# Patient Record
Sex: FEMALE | Race: ASIAN | Employment: OTHER | URBAN - METROPOLITAN AREA
[De-identification: names, ages, dates, MRNs, and addresses within clinical notes are randomized per-mention and may not be internally consistent; named-entity substitution may affect disease eponyms.]

---

## 2019-02-18 ENCOUNTER — HOSPITAL ENCOUNTER (OUTPATIENT)
Dept: CT IMAGING | Age: 69
Discharge: HOME | End: 2019-02-18
Payer: SELF-PAY

## 2019-02-18 DIAGNOSIS — E78.00 HIGH CHOLESTEROL: ICD-10-CM

## 2019-02-18 PROCEDURE — 75571 CT HRT W/O DYE W/CA TEST: CPT

## 2019-02-20 NOTE — CARDIO/PULMONARY
Cardiopulmonary Rehab: I reached this patient by phone and shared her coronary calcium CT score of \"42 \" with her. Education given regarding coronary artery disease and its effects on the cardiovascular system were reviewed. Patient states that she does not have a family history of coronary artery disease, that she does have diabetes, and  states she is not a nicotine user. Patient reports she is currently requiring cholesterol medication but not blood pressure medication. Patient reports being of normal weight, reports making heart healthy diet choices on most days and is regularly physically active. Patient does not feel that stress is a risk factor for her. Patient to follow up with primary care physician, Dr. De Cox, and asks that we fax a copy of this report to him - which we will do. Understanding verbalized and no further questions at this time.

## 2019-02-21 ENCOUNTER — TELEPHONE (OUTPATIENT)
Dept: CARDIAC REHAB | Age: 69
End: 2019-02-21

## 2019-02-21 NOTE — TELEPHONE ENCOUNTER
2/21/2019 [facility]ness: Faxed Ms. Montanez Done heart ct scan results to physician, Dr. Robbie Edwards, per Sujatha Rodriguez request. Kali Julien

## 2021-02-26 ENCOUNTER — TRANSCRIBE ORDER (OUTPATIENT)
Dept: GENERAL RADIOLOGY | Age: 71
End: 2021-02-26

## 2021-02-26 ENCOUNTER — HOSPITAL ENCOUNTER (OUTPATIENT)
Dept: GENERAL RADIOLOGY | Age: 71
Discharge: HOME | End: 2021-02-26
Payer: MEDICARE

## 2021-02-26 DIAGNOSIS — M25.511 PAIN IN RIGHT SHOULDER: ICD-10-CM

## 2021-02-26 DIAGNOSIS — M25.512 PAIN IN LEFT SHOULDER: ICD-10-CM

## 2021-02-26 DIAGNOSIS — M25.511 PAIN IN RIGHT SHOULDER: Primary | ICD-10-CM

## 2021-02-26 PROCEDURE — 71046 X-RAY EXAM CHEST 2 VIEWS: CPT | Performed by: INTERNAL MEDICINE

## 2021-07-15 ENCOUNTER — OFFICE VISIT (OUTPATIENT)
Dept: ENT CLINIC | Age: 71
End: 2021-07-15
Payer: MEDICARE

## 2021-07-15 VITALS
HEIGHT: 63 IN | DIASTOLIC BLOOD PRESSURE: 68 MMHG | RESPIRATION RATE: 18 BRPM | WEIGHT: 134 LBS | BODY MASS INDEX: 23.74 KG/M2 | SYSTOLIC BLOOD PRESSURE: 108 MMHG | HEART RATE: 75 BPM | OXYGEN SATURATION: 97 % | TEMPERATURE: 97.1 F

## 2021-07-15 DIAGNOSIS — K13.21 ORAL LEUKOPLAKIA: ICD-10-CM

## 2021-07-15 DIAGNOSIS — R42 DIZZINESS: Primary | ICD-10-CM

## 2021-07-15 PROCEDURE — G8420 CALC BMI NORM PARAMETERS: HCPCS | Performed by: OTOLARYNGOLOGY

## 2021-07-15 PROCEDURE — 1090F PRES/ABSN URINE INCON ASSESS: CPT | Performed by: OTOLARYNGOLOGY

## 2021-07-15 PROCEDURE — G8510 SCR DEP NEG, NO PLAN REQD: HCPCS | Performed by: OTOLARYNGOLOGY

## 2021-07-15 PROCEDURE — 3017F COLORECTAL CA SCREEN DOC REV: CPT | Performed by: OTOLARYNGOLOGY

## 2021-07-15 PROCEDURE — G8400 PT W/DXA NO RESULTS DOC: HCPCS | Performed by: OTOLARYNGOLOGY

## 2021-07-15 PROCEDURE — G8427 DOCREV CUR MEDS BY ELIG CLIN: HCPCS | Performed by: OTOLARYNGOLOGY

## 2021-07-15 PROCEDURE — 99204 OFFICE O/P NEW MOD 45 MIN: CPT | Performed by: OTOLARYNGOLOGY

## 2021-07-15 PROCEDURE — G8536 NO DOC ELDER MAL SCRN: HCPCS | Performed by: OTOLARYNGOLOGY

## 2021-07-15 PROCEDURE — 1101F PT FALLS ASSESS-DOCD LE1/YR: CPT | Performed by: OTOLARYNGOLOGY

## 2021-07-15 RX ORDER — CHLORHEXIDINE GLUCONATE 1.2 MG/ML
15 RINSE ORAL EVERY 12 HOURS
Qty: 420 ML | Refills: 0 | Status: SHIPPED | OUTPATIENT
Start: 2021-07-15 | End: 2021-07-29

## 2021-07-15 NOTE — PROGRESS NOTES
1. Have you been to the ER, urgent care clinic since your last visit? Hospitalized since your last visit? No    2. Have you seen or consulted any other health care providers outside of the [address] [place] [address] since your last visit? Include any pap smears or colon screening.  No   Chief Complaint   Patient presents with    Dizziness     Patient c/o dizziness

## 2021-07-19 NOTE — PROGRESS NOTES
Otolaryngology-Head and Neck Surgery  New Patient Visit     Patient: Dionne Eduardo  YOB: 1950  MRN: [id number]  Date of Service: 7/15/2021    Chief Complaint:   Chief Complaint   Patient presents with    Dizziness     Patient c/o dizziness         History of Present Illness: Dionne Eduardo is a 69y.o. year old female who presents today for discussion of dizziness. Notes sudden onset of dizziness last week. Describes room spinning dizziness, intermittent episodes, with sensation of fogginess. Duration of episodes slightly unclear, perhaps minutes    Over the last few days has felt better    Often triggered by lying down, though unsure of laterality    No hearing concerns or changes    No medication changes    Now feels back to normal    Past Medical History:  Past Medical History:   Diagnosis Date    [facility]     [facility] ([facility]     Dizziness     Hypercholesteremia     Hyperlipidemia        Past Surgical History:   No past surgical history on file. Medications:   Current Outpatient Medications   Medication Instructions    chlorhexidine (Peridex) 0.12 % solution 15 mL, Swish and Spit, EVERY 12 HOURS    lovastatin (MEVACOR) 40 mg, EVERY BEDTIME    metformin (GLUCOPHAGE) 500 mg tablet 2 TIMES DAILY WITH MEALS       Allergies: Allergies   Allergen Reactions    Aspirin Nausea Only     Gi upset       Social History:   Social History     Tobacco Use    Smoking status: Current Every Day Smoker     Types: Cigarettes    Smokeless tobacco: Never Used   Vaping Use    Vaping Use: Never used   Substance Use Topics    Alcohol use: No    Drug use: No        Family History:  Family History   Family history unknown: Yes       Review of Systems:    Consitutional: denies fever, excessive weight gain or loss. Eyes: denies diplopia, eye pain. Integumentary: denies new concerning skin lesions. Ears, Nose, Mouth, Throat: denies except as per HPI.   Endocrine: denies hot or cold intolerance, increased thirst.  Respiratory: denies cough, hemoptysis, wheezing  Gastrointestinal: denies trouble swallowing, nausea, emesis, regurgitation  Musculoskeletal: denies muscle weakness or wasting  Cardiovascular: denies chest pain, shortness of breath  Neurologic: denies seizures, numbness or tingling, syncope  Hematologic: denies easy bleeding or bruising    Physical Examination:   Vitals:    07/15/21 1105   BP: 108/68   BP 1 Location: Left upper arm   BP Patient Position: Sitting   BP Cuff Size: Adult   Pulse: 75   Temp: 97.1 °F (36.2 °C)   TempSrc: Temporal   Resp: 18   Height: 5' 3\" (1.6 m)   Weight: 134 lb (60.8 kg)   SpO2: 97%        General: Comfortable, pleasant, appears stated age  Voice: Strong, speaking in full sentences, no stridor    Face: No masses or lesions, facial strength symmetric   Ears: External ears unremarkable. Bilateral ear canal clear. Tympanic membrane clear and intact, with visible landmarks. Clear middle ear space  Nose: External nose unremarkable. Dorsum midline. Anterior rhinoscopy demonstrates no lesions. Septum midline. Turbinates without hypertrophy. Oral Cavity / Oropharynx: No trismus. Patchy leukoplakia involving right buccal mucosa, about 1.5 cm. Does not bleed with manipulation, non tender. No lesions. Tongue is midline and mobile. Palate elevates symmetrically. Uvula midline. Tonsils unremarkable. Base of tongue soft. Floor of mouth soft. Neck: Supple. No adenopathy. Thyroid unremarkable. Palpable laryngeal landmarks. Full neck range of motion   Neurologic: CN II - XI intact. Normal gait. No spontaneous, gaze induced, head shaky nystagmus. Neg No Halpike in either direction      Assessment and Plan:   1. Dizziness  a. Has improved  b. Suspect may be BPPV, though history slightly difficult to elucidate  c. Sleep slightly upright  d. Avoid sudden headmovements  e. Home Ryan Yeh Daroff exercises provided to patient   2. Oral leukoplakia  3.  Hx of tobacco, chewing tobacco (betel nut a few years ago)  a. Pt asymptomatic  b. Incidentally noted on exam today  c. Given tobacco hx would recheck to ensure stability  d. Consider bioppsy if any changes or symptoms  e. Follow up at our [facility] office   f. Signs to watch out for reviewed        The patient was instructed to return to clinic if no improvement or progression of symptoms. Signs to watch out for reviewed.       MD Mark SykesSl[facility]  [address] [place] [address]  Office Phone: [phone]

## 2021-08-03 ENCOUNTER — TELEPHONE (OUTPATIENT)
Dept: ENT CLINIC | Age: 71
End: 2021-08-03

## 2021-08-03 NOTE — TELEPHONE ENCOUNTER
Patient's  called to schedule an appointment because the patient started a vertigo spell. Patient's  wanted to come in on Thursday if possible.  Best call back number: [phone]

## 2021-08-03 NOTE — TELEPHONE ENCOUNTER
Called spoke wit pt , who states felling drowsy having an episode of vertigo per Dr Moises Esteban informed pt to do the exercises provided given last appt, pt accepted appt for this Thursday 8/5/21 at 11 am.   Pt verbalized understanding of information discussed w/ no further questions at this time.

## 2021-08-05 ENCOUNTER — OFFICE VISIT (OUTPATIENT)
Dept: ENT CLINIC | Age: 71
End: 2021-08-05

## 2021-08-05 ENCOUNTER — OFFICE VISIT (OUTPATIENT)
Dept: ENT CLINIC | Age: 71
End: 2021-08-05
Payer: MEDICARE

## 2021-08-05 VITALS
TEMPERATURE: 97.5 F | WEIGHT: 133 LBS | BODY MASS INDEX: 23.57 KG/M2 | HEIGHT: 63 IN | OXYGEN SATURATION: 98 % | SYSTOLIC BLOOD PRESSURE: 120 MMHG | DIASTOLIC BLOOD PRESSURE: 72 MMHG | RESPIRATION RATE: 16 BRPM | HEART RATE: 78 BPM

## 2021-08-05 DIAGNOSIS — K13.21 ORAL LEUKOPLAKIA: ICD-10-CM

## 2021-08-05 DIAGNOSIS — H90.3 SENSORINEURAL HEARING LOSS (SNHL) OF BOTH EARS: ICD-10-CM

## 2021-08-05 DIAGNOSIS — R42 DIZZINESS: Primary | ICD-10-CM

## 2021-08-05 PROCEDURE — G8427 DOCREV CUR MEDS BY ELIG CLIN: HCPCS | Performed by: OTOLARYNGOLOGY

## 2021-08-05 PROCEDURE — G8536 NO DOC ELDER MAL SCRN: HCPCS | Performed by: OTOLARYNGOLOGY

## 2021-08-05 PROCEDURE — 1090F PRES/ABSN URINE INCON ASSESS: CPT | Performed by: OTOLARYNGOLOGY

## 2021-08-05 PROCEDURE — 99214 OFFICE O/P EST MOD 30 MIN: CPT | Performed by: OTOLARYNGOLOGY

## 2021-08-05 PROCEDURE — 1101F PT FALLS ASSESS-DOCD LE1/YR: CPT | Performed by: OTOLARYNGOLOGY

## 2021-08-05 PROCEDURE — G8420 CALC BMI NORM PARAMETERS: HCPCS | Performed by: OTOLARYNGOLOGY

## 2021-08-05 PROCEDURE — 92567 TYMPANOMETRY: CPT | Performed by: AUDIOLOGIST

## 2021-08-05 PROCEDURE — G8432 DEP SCR NOT DOC, RNG: HCPCS | Performed by: OTOLARYNGOLOGY

## 2021-08-05 PROCEDURE — 3017F COLORECTAL CA SCREEN DOC REV: CPT | Performed by: OTOLARYNGOLOGY

## 2021-08-05 PROCEDURE — G8400 PT W/DXA NO RESULTS DOC: HCPCS | Performed by: OTOLARYNGOLOGY

## 2021-08-05 PROCEDURE — 92557 COMPREHENSIVE HEARING TEST: CPT | Performed by: AUDIOLOGIST

## 2021-08-05 NOTE — LETTER
8/5/2021    Patient: Jonn Linder   YOB: 1950   Date of Visit: 8/5/2021     Luis Burk MD  [place] [address],[address] [place] [address]  Via Fax: [phone]    Dear Luis Burk MD,      Thank you for referring Ms. Jonn Linder to [facility] EAR NOSE AND THROAT [address] for evaluation. My notes for this consultation are attached. If you have questions, please do not hesitate to call me. I look forward to following your patient along with you.       Sincerely,    Dania Antunez MD

## 2021-08-05 NOTE — PROGRESS NOTES
1. Have you been to the ER, urgent care clinic since your last visit? Hospitalized since your last visit? No    2. Have you seen or consulted any other health care providers outside of the [address] [place] [address] since your last visit? Include any pap smears or colon screening.  No   Chief Complaint   Patient presents with    Follow-up     Patient is here to f/u on Dizziness and Leukoplakia

## 2021-08-05 NOTE — PROGRESS NOTES
Lorenza Grossman, a 66y.o. year old female, was seen in ENT clinic today for a hearing evaluation on referral from Dr. Arline Presley. Patient complains of vertigo and occasional tinnitus. Patient reports that she has no concerns regarding her hearing. Per patient report she had a hearing test 2 years prior and results were \"normal\" (records unavailable today). Patient's most recent vertigo episode was 8-2-2021. Otoscopy: normal external ear canals, bilaterally. Cerumen present in the right ear canal; tympanic membrane not visible. Tympanometry: RE Type Ad, hypermobile  LE Type A, normal    SRT: RE Speech Reception Threshold (SRT) was obtained at 35 dBHL LE Speech Reception Threshold (SRT) was obtained at 30 dBHL    WRS: RE Good in quiet when words were presented at 75 dBHL. WRS: LE Good in quiet when words were presented at 70 dBHL. Pure tone audiometry:*  RE: Borderline normal sloping to severe sensorineural hearing loss  LE: WNL sloping to moderate-severe sensorineural hearing loss    *Fair reliability. Patient required multiple attempts at reinstruction, particularly with masked bone thresholds. Patient's  remained in the araiza during testing to assist and explain testing instructions. Sensorineural hearing loss, bilaterally. No gross asymmetry noted    Impressions:  hearing loss requiring medical/otologic and audiologic follow-up  Discussed results of today's testing with patient. Patient is a good candidate for amplification. Discussed benefits of hearing aids and recommended hearing aid evaluation to discuss options if patient feels she is having difficulty hearing. Patient stated that she felt she was hearing well at this time \"as long as people speak slowly. \" Provided familiar sounds audiogram with average conversational volume noted and discussed patient's difficulty with hearing certain sounds of speech at normal conversational volume. Reiterated that hearing aids would help.  Patient would like to wait on pursuing amplification at this time. Recommended repeat hearing test in 1 year or sooner if change is noted. Recommended follow-up with ENT for vertigo. Provided hearing aid evaluation handout today. Plan:  Follow-up with ENT. Repeat audiogram 1 year or sooner if change is noted.   Hearing aid evaluation upon request.    Carole Flores   Doctor of Audiology

## 2021-08-05 NOTE — PROGRESS NOTES
Otolaryngology-Head and Neck Surgery  Follow Up Patient Visit     Patient: Howie Donohue  YOB: 1950  MRN: [id number]  Date of Service: 8/5/2021      Chief Complaint:   Chief Complaint   Patient presents with    Follow-up     Patient is here to f/u on Dizziness and Leukoplakia     Interval hx  Notes trial of peridex seemed to burn, sting her mouth  Denies pain but does have sensitivity to spicy foods   No bleeding    Did well with dizziness until Monday when again sudden onset of vertigo, lasting seconds, when lying back to the left  Feels tinnitus but denies hearing concerns or changes    History of Present Illness: Howie Donohue is a 66y.o. year old female who presents today for discussion of dizziness. Notes sudden onset of dizziness last week. Describes room spinning dizziness, intermittent episodes, with sensation of fogginess. Duration of episodes slightly unclear, perhaps minutes    Over the last few days has felt better    Often triggered by lying down, though unsure of laterality    No hearing concerns or changes    No medication changes    Now feels back to normal    Past Medical History:  Past Medical History:   Diagnosis Date    [facility]     [facility] ([facility]     Dizziness     Hypercholesteremia     Hyperlipidemia        Past Surgical History:   No past surgical history on file. Medications:   Current Outpatient Medications   Medication Instructions    lovastatin (MEVACOR) 40 mg, EVERY BEDTIME    metformin (GLUCOPHAGE) 500 mg tablet 2 TIMES DAILY WITH MEALS       Allergies:    Allergies   Allergen Reactions    Aspirin Nausea Only     Gi upset       Social History:   Social History     Tobacco Use    Smoking status: Current Every Day Smoker     Types: Cigarettes    Smokeless tobacco: Never Used   Vaping Use    Vaping Use: Never used   Substance Use Topics    Alcohol use: No    Drug use: No        Family History:  Family History   Family history unknown: Yes       Review of Systems:    Consitutional: denies fever, excessive weight gain or loss. Eyes: denies diplopia, eye pain. Integumentary: denies new concerning skin lesions. Ears, Nose, Mouth, Throat: denies except as per HPI. Endocrine: denies hot or cold intolerance, increased thirst.  Respiratory: denies cough, hemoptysis, wheezing  Gastrointestinal: denies trouble swallowing, nausea, emesis, regurgitation  Musculoskeletal: denies muscle weakness or wasting  Cardiovascular: denies chest pain, shortness of breath  Neurologic: denies seizures, numbness or tingling, syncope  Hematologic: denies easy bleeding or bruising    Physical Examination:   Vitals:    08/05/21 1041   BP: 120/72   BP 1 Location: Left upper arm   BP Patient Position: Sitting   BP Cuff Size: Adult   Pulse: 78   Temp: 97.5 °F (36.4 °C)   TempSrc: Temporal   Resp: 16   Height: 5' 3\" (1.6 m)   Weight: 133 lb (60.3 kg)   SpO2: 98%        General: Comfortable, pleasant, appears stated age  Voice: Strong, speaking in full sentences, no stridor    Face: No masses or lesions, facial strength symmetric   Ears: External ears unremarkable. Bilateral ear canal clear. Tympanic membrane clear and intact, with visible landmarks. Clear middle ear space  Nose: External nose unremarkable. Dorsum midline. Anterior rhinoscopy demonstrates no lesions. Septum midline. Turbinates without hypertrophy. Oral Cavity / Oropharynx: No trismus. Patchy leukoplakia involving right buccal mucosa, about 1.5 cm. Does not bleed with manipulation, tender to manipulation. Tongue is midline and mobile. Palate elevates symmetrically. Uvula midline. Tonsils unremarkable. Base of tongue soft. Floor of mouth soft. Neck: Supple. No adenopathy. Thyroid unremarkable. Palpable laryngeal landmarks. Full neck range of motion   Neurologic: CN II - XI intact. Normal gait. No spontaneous, gaze induced, head shake nystagmus. Neg No Halpike in either direction.  Epley maneuver to the left performed    Audiogram shows bilateral SNHL, WRS 80%    Assessment and Plan:   1. Dizziness  a. None today  b. Suspect may be BPPV, though Olga [place] negative today  c. S/p Epley maneuver today  d. Sleep slightly upright  e. Avoid sudden headmovements  f. Home Rosa Antony Daroff exercises provided to patient   2. Oral leukoplakia  3. Hx of tobacco, chewing tobacco (betel nut a few years ago)  a. Incidentally noted on exam LOV   b. Its certainly not better and she does admit to some sensitivity with certain foods   c. Will plan for biopsy, she has an upcoming wedding next week, so will plan to do after that       The patient was instructed to return to clinic if no improvement or progression of symptoms. Signs to watch out for reviewed.       MD Akua [facility]ileAt[facility]  [address] [place] [address]  Office Phone: [phone]

## 2021-08-20 ENCOUNTER — OFFICE VISIT (OUTPATIENT)
Dept: ENT CLINIC | Age: 71
End: 2021-08-20
Payer: MEDICARE

## 2021-08-20 VITALS
DIASTOLIC BLOOD PRESSURE: 68 MMHG | WEIGHT: 133 LBS | OXYGEN SATURATION: 98 % | BODY MASS INDEX: 23.57 KG/M2 | TEMPERATURE: 98.2 F | HEIGHT: 63 IN | HEART RATE: 88 BPM | RESPIRATION RATE: 16 BRPM | SYSTOLIC BLOOD PRESSURE: 120 MMHG

## 2021-08-20 DIAGNOSIS — K13.21 ORAL LEUKOPLAKIA: Primary | ICD-10-CM

## 2021-08-20 PROCEDURE — 40808 BIOPSY OF MOUTH LESION: CPT | Performed by: OTOLARYNGOLOGY

## 2021-08-20 NOTE — PROGRESS NOTES
Otolaryngology-Head and Neck Surgery  Procedure     Patient: Howie Donohue  YOB: 1950  MRN: [id number]  Date of Service: 8/20/2021      Chief Complaint:   Chief Complaint   Patient presents with    Minor Surgery     Patient is here today to have oral biopsy performed. No further dizziness  No changes oral cavity    Past Medical History:  Past Medical History:   Diagnosis Date    [facility]     Diabetes ([facility]     Dizziness     Hypercholesteremia     Hyperlipidemia        Past Surgical History:   No past surgical history on file. Medications:   Current Outpatient Medications   Medication Instructions    lovastatin (MEVACOR) 40 mg, EVERY BEDTIME    metformin (GLUCOPHAGE) 500 mg tablet 2 TIMES DAILY WITH MEALS       Allergies: Allergies   Allergen Reactions    Aspirin Nausea Only     Gi upset       Social History:   Social History     Tobacco Use    Smoking status: Current Every Day Smoker     Types: Cigarettes    Smokeless tobacco: Never Used   Vaping Use    Vaping Use: Never used   Substance Use Topics    Alcohol use: No    Drug use: No        Family History:  Family History   Family history unknown: Yes       Review of Systems:    Consitutional: denies fever, excessive weight gain or loss. Eyes: denies diplopia, eye pain. Integumentary: denies new concerning skin lesions. Ears, Nose, Mouth, Throat: denies except as per HPI.   Endocrine: denies hot or cold intolerance, increased thirst.  Respiratory: denies cough, hemoptysis, wheezing  Gastrointestinal: denies trouble swallowing, nausea, emesis, regurgitation  Musculoskeletal: denies muscle weakness or wasting  Cardiovascular: denies chest pain, shortness of breath  Neurologic: denies seizures, numbness or tingling, syncope  Hematologic: denies easy bleeding or bruising    Physical Examination:   Vitals:    08/20/21 1306   BP: 120/68   BP 1 Location: Left upper arm   BP Patient Position: Sitting   BP Cuff Size: Adult Pulse: 88   Temp: 98.2 °F (36.8 °C)   TempSrc: Temporal   Resp: 16   Height: 5' 3\" (1.6 m)   Weight: 133 lb (60.3 kg)   SpO2: 98%        General: Comfortable, pleasant, appears stated age  Voice: Strong, speaking in full sentences, no stridor    Oral Cavity / Oropharynx: No trismus. Patchy leukoplakia involving right buccal mucosa, about 1.5 cm. Does not bleed with manipulation, tender to manipulation. Tongue is midline and mobile. Palate elevates symmetrically. Uvula midline. Tonsils unremarkable. Base of tongue soft. Floor of mouth soft. PROCEDURE: ORAL CAVITY BIOPSY     Pre-Op Diagnosis: Oral leukoplakia   Post-Op Diagnosis: Same as preoperative diagnosis. Procedure(s): Biopsy of oral cavity   Anesthesia: Local, 2% lidocaine, 1:100,000 epinephrine      Description of Procedure:   Informed consent was obtained. Risks, benefits and alternatives reviewed including risk of bleeding, infection, non diagnostic specimen, need for repeat procedure. Cetacaine was used to topically anesthetize the lesion. Local anesthetic with 1% lidocaine, 1:100,000 epinephrine was infiltrated along the lesion. A thru cutting instrument and scissor were used to obtain a biopsy. Hemostasis was achieved with silver nitrate cautery. She tolerated the procedure well. Assessment and Plan:   a. Oral leukoplakia  i. S/p Biopsy today  ii. Per patient preference, I will call with the results        The patient was instructed to return to clinic if no improvement or progression of symptoms. Signs to watch out for reviewed.       MD Mark Huggins[facility]  [address] [place] [address] 6  [facility]  Office Phone: [phone]

## 2021-08-20 NOTE — PROGRESS NOTES
Visit Vitals  /68 (BP 1 Location: Left upper arm, BP Patient Position: Sitting, BP Cuff Size: Adult)   Pulse 88   Temp 98.2 °F (36.8 °C) (Temporal)   Resp 16   Ht 5' 3\" (1.6 m)   Wt 133 lb (60.3 kg)   SpO2 98%   BMI 23.56 kg/m²     Chief Complaint   Patient presents with    Minor Surgery     Patient is here today to have oral biopsy performed.

## 2021-08-20 NOTE — LETTER
8/20/2021    Patient: Carly Blair   YOB: 1950   Date of Visit: 8/20/2021     Jose Ryan MD  [place] [address]  Via Fax: [phone]    Dear Jose Ryan MD,      Thank you for referring Ms. Carly Blair to [facility] EAR NOSE AND THROAT [address] for evaluation. My notes for this consultation are attached. If you have questions, please do not hesitate to call me. I look forward to following your patient along with you.       Sincerely,    Kris Tejada MD

## 2021-08-23 ENCOUNTER — TRANSCRIBE ORDER (OUTPATIENT)
Dept: SCHEDULING | Age: 71
End: 2021-08-23

## 2021-08-23 DIAGNOSIS — Z13.820 SCREENING FOR OSTEOPOROSIS: Primary | ICD-10-CM

## 2021-08-25 ENCOUNTER — TRANSCRIBE ORDER (OUTPATIENT)
Dept: SCHEDULING | Age: 71
End: 2021-08-25

## 2021-08-25 DIAGNOSIS — M81.0 AGE-RELATED OSTEOPOROSIS WITHOUT CURRENT PATHOLOGICAL FRACTURE: ICD-10-CM

## 2021-08-25 DIAGNOSIS — M85.80 OSTEOPENIA OF THE ELDERLY: Primary | ICD-10-CM

## 2021-08-25 LAB
CPT CODES, 490044: NORMAL
CPT DISCLAIMER: NORMAL
CYTOLOGY SPEC DOC CYTO: NORMAL
DIAGNOSIS SYNOPSIS:: NORMAL
DX ICD CODE: NORMAL
PATH REPORT.GROSS SPEC: NORMAL
PATHOLOGIST NAME: NORMAL
SPECIMEN SOURCE: NORMAL

## 2021-08-26 ENCOUNTER — TELEPHONE (OUTPATIENT)
Dept: ENT CLINIC | Age: 71
End: 2021-08-26

## 2021-08-27 ENCOUNTER — TELEPHONE (OUTPATIENT)
Dept: ENT CLINIC | Age: 71
End: 2021-08-27

## 2021-09-03 ENCOUNTER — HOSPITAL ENCOUNTER (OUTPATIENT)
Dept: MAMMOGRAPHY | Age: 71
Discharge: HOME | End: 2021-09-03
Attending: INTERNAL MEDICINE
Payer: MEDICARE

## 2021-09-03 DIAGNOSIS — M85.80 OSTEOPENIA OF THE ELDERLY: ICD-10-CM

## 2021-09-03 DIAGNOSIS — M81.0 AGE-RELATED OSTEOPOROSIS WITHOUT CURRENT PATHOLOGICAL FRACTURE: ICD-10-CM

## 2021-09-03 PROCEDURE — 77080 DXA BONE DENSITY AXIAL: CPT

## 2021-09-29 ENCOUNTER — TELEPHONE (OUTPATIENT)
Dept: ENT CLINIC | Age: 71
End: 2021-09-29

## 2021-10-22 ENCOUNTER — OFFICE VISIT (OUTPATIENT)
Dept: ENT CLINIC | Age: 71
End: 2021-10-22
Payer: MEDICARE

## 2021-10-22 VITALS
WEIGHT: 134 LBS | TEMPERATURE: 97.2 F | RESPIRATION RATE: 16 BRPM | SYSTOLIC BLOOD PRESSURE: 124 MMHG | OXYGEN SATURATION: 98 % | DIASTOLIC BLOOD PRESSURE: 72 MMHG | HEIGHT: 63 IN | HEART RATE: 91 BPM | BODY MASS INDEX: 23.74 KG/M2

## 2021-10-22 DIAGNOSIS — K13.21 ORAL LEUKOPLAKIA: Primary | ICD-10-CM

## 2021-10-22 DIAGNOSIS — R42 DIZZINESS: ICD-10-CM

## 2021-10-22 PROCEDURE — 99213 OFFICE O/P EST LOW 20 MIN: CPT | Performed by: OTOLARYNGOLOGY

## 2021-10-22 PROCEDURE — G8420 CALC BMI NORM PARAMETERS: HCPCS | Performed by: OTOLARYNGOLOGY

## 2021-10-22 PROCEDURE — G8399 PT W/DXA RESULTS DOCUMENT: HCPCS | Performed by: OTOLARYNGOLOGY

## 2021-10-22 PROCEDURE — G8432 DEP SCR NOT DOC, RNG: HCPCS | Performed by: OTOLARYNGOLOGY

## 2021-10-22 PROCEDURE — 1101F PT FALLS ASSESS-DOCD LE1/YR: CPT | Performed by: OTOLARYNGOLOGY

## 2021-10-22 PROCEDURE — 3017F COLORECTAL CA SCREEN DOC REV: CPT | Performed by: OTOLARYNGOLOGY

## 2021-10-22 PROCEDURE — G8536 NO DOC ELDER MAL SCRN: HCPCS | Performed by: OTOLARYNGOLOGY

## 2021-10-22 PROCEDURE — 1090F PRES/ABSN URINE INCON ASSESS: CPT | Performed by: OTOLARYNGOLOGY

## 2021-10-22 PROCEDURE — G8427 DOCREV CUR MEDS BY ELIG CLIN: HCPCS | Performed by: OTOLARYNGOLOGY

## 2021-10-22 RX ORDER — TRIAMCINOLONE ACETONIDE 1 MG/G
PASTE DENTAL
Qty: 5 G | Refills: 0 | Status: SHIPPED | OUTPATIENT
Start: 2021-10-22

## 2021-10-22 NOTE — PATIENT INSTRUCTIONS
Visit Vitals  /72 (BP 1 Location: Left upper arm, BP Patient Position: Sitting, BP Cuff Size: Adult)   Pulse 91   Temp 97.2 °F (36.2 °C) (Temporal)   Resp 16   Ht 5' 3\" (1.6 m)   Wt 134 lb (60.8 kg)   SpO2 98%   BMI 23.74 kg/m²     Chief Complaint   Patient presents with    Follow-up     Here ti recheck oral Leukoplakia / Dizziness / SNHL

## 2021-10-22 NOTE — PROGRESS NOTES
Otolaryngology-Head and Neck Surgery  Follow Up Patient Visit     Patient: Yaw Perez  YOB: 1950  MRN: [id number]  Date of Service: 10/22/2021      Chief Complaint:   Follow up mouth     Interval hx  Initally seen for dizziness which has improved, however noted to have oral leukoplakia  Underwent biopsy 2 months ago which was benign    Presents in follow up today    No pain or bleeding  Occasionally salty foods may sting    History of Present Illness: Yaw Perez is a 66y.o. year old female who presents today for discussion of dizziness. Notes sudden onset of dizziness last week. Describes room spinning dizziness, intermittent episodes, with sensation of fogginess. Duration of episodes slightly unclear, perhaps minutes    Over the last few days has felt better    Often triggered by lying down, though unsure of laterality    No hearing concerns or changes    No medication changes    Now feels back to normal    Past Medical History:  Past Medical History:   Diagnosis Date    Diabetes ([address]     Diabetes ([address]     Dizziness     Hypercholesteremia     Hyperlipidemia        Past Surgical History:   No past surgical history on file. Medications:   Current Outpatient Medications   Medication Instructions    lovastatin (MEVACOR) 40 mg, EVERY BEDTIME    metformin (GLUCOPHAGE) 500 mg tablet 2 TIMES DAILY WITH MEALS       Allergies: Allergies   Allergen Reactions    Aspirin Nausea Only     Gi upset       Social History:   Social History     Tobacco Use    Smoking status: Current Every Day Smoker     Types: Cigarettes    Smokeless tobacco: Never Used   Vaping Use    Vaping Use: Never used   Substance Use Topics    Alcohol use: No    Drug use: No        Family History:  Family History   Family history unknown: Yes       Review of Systems:    Consitutional: denies fever, excessive weight gain or loss. Eyes: denies diplopia, eye pain.   Integumentary: denies new concerning skin lesions. Ears, Nose, Mouth, Throat: denies except as per HPI. Endocrine: denies hot or cold intolerance, increased thirst.  Respiratory: denies cough, hemoptysis, wheezing  Gastrointestinal: denies trouble swallowing, nausea, emesis, regurgitation  Musculoskeletal: denies muscle weakness or wasting  Cardiovascular: denies chest pain, shortness of breath  Neurologic: denies seizures, numbness or tingling, syncope  Hematologic: denies easy bleeding or bruising    Physical Examination:   There were no vitals filed for this visit. General: Comfortable, pleasant, appears stated age  Voice: Strong, speaking in full sentences, no stridor    Face: No masses or lesions, facial strength symmetric   Ears: External ears unremarkable. Bilateral ear canal clear. Tympanic membrane clear and intact, with visible landmarks. Clear middle ear space  Nose: External nose unremarkable. Dorsum midline. Anterior rhinoscopy demonstrates no lesions. Septum midline. Turbinates without hypertrophy. Oral Cavity / Oropharynx: No trismus. Patchy leukoplakia involving right buccal mucosa, about 1.5 cm. Does not bleed with manipulation, non tender to manipulation. Very stable from prior. Tongue is midline and mobile. Palate elevates symmetrically. Uvula midline. Tonsils unremarkable. Base of tongue soft. Floor of mouth soft. Neck: Supple. No adenopathy. Thyroid unremarkable. Palpable laryngeal landmarks. Full neck range of motion   Neurologic: CN II - XI intact. Normal gait. No spontaneous, gaze induced, head shake nystagmus. Audiogram shows bilateral SNHL, WRS 80%          Oral pathology 8/2021  Component 8/20/21 1400   Diagnosis synopsis: Comment    Comment: Specimen A-Mucosa Biopsy, Oral, right buccal mucosa: BENIGN   SQUAMOUS EPITHELIUM WITH NONSPECIFIC PARAKERATOSIS AND   HYPERKERATOSIS. NEGATIVE FOR DYSPLASIA AND MALIGNANCY. Assessment and Plan:   1. Dizziness  a. Has improved   b. Audiogram normal  2.  Oral leukoplakia  3. Hx of tobacco, chewing tobacco (betel nut a few years ago)  a. Incidentally noted on exam LOV   b. S/p biopsy - pathology printed result provided to patient today  c. Signs to watch out for discussed in detail  d. Can consider trial kenalog paste  e. Otherwise with minimal symptoms, benign biopsy, and stability in appearance, would favor cont observation  f. RTC in 3-4 months - sooner if issues      The patient was instructed to return to clinic if no improvement or progression of symptoms. Signs to watch out for reviewed.       MD [address] [facility]  [address] [place] [address] [address]  [facility]  Office Phone: [phone]

## 2021-12-06 ENCOUNTER — OFFICE VISIT (OUTPATIENT)
Dept: ENT CLINIC | Age: 71
End: 2021-12-06
Payer: MEDICARE

## 2021-12-06 VITALS
RESPIRATION RATE: 17 BRPM | TEMPERATURE: 97.4 F | DIASTOLIC BLOOD PRESSURE: 78 MMHG | WEIGHT: 134 LBS | HEIGHT: 63 IN | SYSTOLIC BLOOD PRESSURE: 118 MMHG | OXYGEN SATURATION: 98 % | BODY MASS INDEX: 23.74 KG/M2 | HEART RATE: 76 BPM

## 2021-12-06 DIAGNOSIS — K14.6 BURNING MOUTH SYNDROME: ICD-10-CM

## 2021-12-06 DIAGNOSIS — R68.2 DRY MOUTH: ICD-10-CM

## 2021-12-06 DIAGNOSIS — K13.21 LEUKOPLAKIA ORAL MUCOSA: Primary | ICD-10-CM

## 2021-12-06 PROCEDURE — 3017F COLORECTAL CA SCREEN DOC REV: CPT | Performed by: OTOLARYNGOLOGY

## 2021-12-06 PROCEDURE — 1090F PRES/ABSN URINE INCON ASSESS: CPT | Performed by: OTOLARYNGOLOGY

## 2021-12-06 PROCEDURE — G8420 CALC BMI NORM PARAMETERS: HCPCS | Performed by: OTOLARYNGOLOGY

## 2021-12-06 PROCEDURE — G8432 DEP SCR NOT DOC, RNG: HCPCS | Performed by: OTOLARYNGOLOGY

## 2021-12-06 PROCEDURE — G8536 NO DOC ELDER MAL SCRN: HCPCS | Performed by: OTOLARYNGOLOGY

## 2021-12-06 PROCEDURE — G8399 PT W/DXA RESULTS DOCUMENT: HCPCS | Performed by: OTOLARYNGOLOGY

## 2021-12-06 PROCEDURE — 99214 OFFICE O/P EST MOD 30 MIN: CPT | Performed by: OTOLARYNGOLOGY

## 2021-12-06 PROCEDURE — 1101F PT FALLS ASSESS-DOCD LE1/YR: CPT | Performed by: OTOLARYNGOLOGY

## 2021-12-06 PROCEDURE — G8427 DOCREV CUR MEDS BY ELIG CLIN: HCPCS | Performed by: OTOLARYNGOLOGY

## 2021-12-06 NOTE — PROGRESS NOTES
Visit Vitals  /78 (BP 1 Location: Left upper arm, BP Patient Position: Sitting, BP Cuff Size: Adult)   Pulse 76   Temp 97.4 °F (36.3 °C) (Temporal)   Resp 17   Ht 5' 3\" (1.6 m)   Wt 134 lb (60.8 kg)   SpO2 98%   BMI 23.74 kg/m²     Chief Complaint   Patient presents with    Follow-up     Oral leukoplakia

## 2021-12-06 NOTE — PROGRESS NOTES
Otolaryngology-Head and Neck Surgery  Follow Up Patient Visit     Patient: Marybeth Don  YOB: 1950  MRN: [id number]  Date of Service: 12/6/2021      Chief Complaint:   Follow up mouth     Interval hx  Initally seen for dizziness which has improved, however noted to have oral leukoplakia  Underwent biopsy 2 months ago which was benign    Presents in follow up today    No pain or bleeding  Occasionally salty foods may sting    History of Present Illness: Marybeth Don is a 66y.o. year old female who presents today for discussion of dizziness. Notes sudden onset of dizziness last week. Describes room spinning dizziness, intermittent episodes, with sensation of fogginess. Duration of episodes slightly unclear, perhaps minutes    Over the last few days has felt better    Often triggered by lying down, though unsure of laterality    No hearing concerns or changes    No medication changes    Now feels back to normal    12/6/21 -  6 week f/u pt states she used some of the kenalog paste, did not seem to help; she c/o burning both sides of mouth spencer with spicy or salty foods; also dry mouth at night; no further dizziness    Past Medical History:  Past Medical History:   Diagnosis Date    [facility]     Diabetes ([facility]     Dizziness     Hypercholesteremia     Hyperlipidemia        Past Surgical History:   History reviewed. No pertinent surgical history. Medications:   Current Outpatient Medications   Medication Instructions    lovastatin (MEVACOR) 40 mg, EVERY BEDTIME    metformin (GLUCOPHAGE) 500 mg tablet 2 TIMES DAILY WITH MEALS    triamcinolone acetonide (KENALOG) 0.1 % dental paste Apply to right cheek mucosa twice daily x 10 days       Allergies:    Allergies   Allergen Reactions    Aspirin Nausea Only     Gi upset       Social History:   Social History     Tobacco Use    Smoking status: Current Every Day Smoker     Types: Cigarettes    Smokeless tobacco: Never Used   Vaping Use    Vaping Use: Never used   Substance Use Topics    Alcohol use: No    Drug use: No        Family History:  Family History   Family history unknown: Yes       Review of Systems:    Consitutional: denies fever, excessive weight gain or loss. Eyes: denies diplopia, eye pain. Integumentary: denies new concerning skin lesions. Ears, Nose, Mouth, Throat: denies except as per HPI. Endocrine: denies hot or cold intolerance, increased thirst.  Respiratory: denies cough, hemoptysis, wheezing  Gastrointestinal: denies trouble swallowing, nausea, emesis, regurgitation  Musculoskeletal: denies muscle weakness or wasting  Cardiovascular: denies chest pain, shortness of breath  Neurologic: denies seizures, numbness or tingling, syncope  Hematologic: denies easy bleeding or bruising    Physical Examination:   Vitals:    12/06/21 1325   BP: 118/78   BP 1 Location: Left upper arm   BP Patient Position: Sitting   BP Cuff Size: Adult   Pulse: 76   Temp: 97.4 °F (36.3 °C)   TempSrc: Temporal   Resp: 17   Height: 5' 3\" (1.6 m)   Weight: 134 lb (60.8 kg)   SpO2: 98%        General: Comfortable, pleasant, appears stated age  Voice: Strong, speaking in full sentences, no stridor    Face: No masses or lesions, facial strength symmetric   Ears: External ears unremarkable. Bilateral ear canal clear. Tympanic membrane clear and intact, with visible landmarks. Clear middle ear space  Nose: External nose unremarkable. Dorsum midline. Anterior rhinoscopy demonstrates no lesions. Septum midline. Turbinates without hypertrophy. Oral Cavity / Oropharynx: No trismus. Leukoplakia involving right posterior buccal mucosa, about 1.5 cm. Does not bleed with manipulation, non tender to manipulation. Left side has slight pigmentation posteriorly no leukoplakia. Palate elevates symmetrically. Uvula midline. Tonsils unremarkable. Base of tongue soft. Floor of mouth soft. Neck: Supple. No adenopathy. Thyroid unremarkable. Palpable laryngeal landmarks. Full neck range of motion   Neurologic: CN II - XI intact. Normal gait. No spontaneous, gaze induced, head shake nystagmus. Audiogram shows bilateral SNHL, WRS 80%      Oral pathology 8/2021  Component 8/20/21 1400   Diagnosis synopsis: Comment    Comment: Specimen A-Mucosa Biopsy, Oral, right buccal mucosa: BENIGN   SQUAMOUS EPITHELIUM WITH NONSPECIFIC PARAKERATOSIS AND   HYPERKERATOSIS. NEGATIVE FOR DYSPLASIA AND MALIGNANCY. Assessment and Plan:   1. Dizziness  a. Has improved   b. Audiogram showing SNHL  2. Oral leukoplakia - biopsy benign. She also has burning mouth, dry mouth. I suggest she see her dentist also in case there is any trauma from her molars    Fu 3 mos    The patient was instructed to return to clinic if no improvement or progression of symptoms. Signs to watch out for reviewed.

## 2022-03-19 PROBLEM — K14.6 BURNING MOUTH SYNDROME: Status: ACTIVE | Noted: 2021-12-06

## 2022-03-19 PROBLEM — R68.2 DRY MOUTH: Status: ACTIVE | Noted: 2021-12-06

## 2022-03-20 PROBLEM — K13.21 LEUKOPLAKIA ORAL MUCOSA: Status: ACTIVE | Noted: 2021-12-06

## 2022-05-02 ENCOUNTER — OFFICE VISIT (OUTPATIENT)
Dept: ENT CLINIC | Age: 72
End: 2022-05-02
Payer: MEDICARE

## 2022-05-02 VITALS
SYSTOLIC BLOOD PRESSURE: 122 MMHG | BODY MASS INDEX: 23.74 KG/M2 | DIASTOLIC BLOOD PRESSURE: 78 MMHG | OXYGEN SATURATION: 98 % | RESPIRATION RATE: 17 BRPM | WEIGHT: 134 LBS | HEART RATE: 83 BPM | HEIGHT: 63 IN

## 2022-05-02 DIAGNOSIS — K13.21 LEUKOPLAKIA ORAL MUCOSA: Primary | ICD-10-CM

## 2022-05-02 DIAGNOSIS — K14.6 BURNING MOUTH SYNDROME: ICD-10-CM

## 2022-05-02 DIAGNOSIS — H90.3 SENSORINEURAL HEARING LOSS (SNHL) OF BOTH EARS: ICD-10-CM

## 2022-05-02 PROCEDURE — G8399 PT W/DXA RESULTS DOCUMENT: HCPCS | Performed by: OTOLARYNGOLOGY

## 2022-05-02 PROCEDURE — 3017F COLORECTAL CA SCREEN DOC REV: CPT | Performed by: OTOLARYNGOLOGY

## 2022-05-02 PROCEDURE — G8427 DOCREV CUR MEDS BY ELIG CLIN: HCPCS | Performed by: OTOLARYNGOLOGY

## 2022-05-02 PROCEDURE — G8536 NO DOC ELDER MAL SCRN: HCPCS | Performed by: OTOLARYNGOLOGY

## 2022-05-02 PROCEDURE — 1101F PT FALLS ASSESS-DOCD LE1/YR: CPT | Performed by: OTOLARYNGOLOGY

## 2022-05-02 PROCEDURE — 99214 OFFICE O/P EST MOD 30 MIN: CPT | Performed by: OTOLARYNGOLOGY

## 2022-05-02 PROCEDURE — 1090F PRES/ABSN URINE INCON ASSESS: CPT | Performed by: OTOLARYNGOLOGY

## 2022-05-02 PROCEDURE — G8510 SCR DEP NEG, NO PLAN REQD: HCPCS | Performed by: OTOLARYNGOLOGY

## 2022-05-02 PROCEDURE — G8420 CALC BMI NORM PARAMETERS: HCPCS | Performed by: OTOLARYNGOLOGY

## 2022-05-02 NOTE — LETTER
5/2/2022    Patient: Erich Parham   YOB: 1950   Date of Visit: 5/2/2022     Sharon Guillaume MD  [address] [address]  Via Fax: [phone]    Dear Sharon Guillaume MD,      Thank you for referring Ms. Minerva Rae to [address] [place] [address] for evaluation. My notes for this consultation are attached. If you have questions, please do not hesitate to call me. I look forward to following your patient along with you.       Sincerely,    Deidre Robles MD

## 2022-05-02 NOTE — PROGRESS NOTES
Otolaryngology-Head and Neck Surgery  Follow Up Patient Visit     Patient: Wilfredo Erwin  YOB: 1950  MRN: [id number]  Date of Service: 5/2/2022      Chief Complaint:   Follow up mouth     Interval hx  Initally seen for dizziness which has improved, however noted to have oral leukoplakia  Underwent biopsy 2 months ago which was benign    Presents in follow up today    No pain or bleeding  Occasionally salty foods may sting    History of Present Illness: Wilfredo Erwin is a 66y.o. year old female who presents today for discussion of dizziness. Notes sudden onset of dizziness last week. Describes room spinning dizziness, intermittent episodes, with sensation of fogginess. Duration of episodes slightly unclear, perhaps minutes    Over the last few days has felt better    Often triggered by lying down, though unsure of laterality    No hearing concerns or changes    No medication changes    Now feels back to normal    12/6/21 -  6 week f/u pt states she used some of the kenalog paste, did not seem to help; she c/o burning both sides of mouth spencer with spicy or salty foods; also dry mouth at night; no further dizziness    5/2/2 - 6 mos f/u - burning mouth syndrome, oral leukoplakia - symptoms remain same - she went to [facility] saw a doctor there who dx her with lichen planus and gave her a regimen of griseofulvin, azathioprine, topical steroid/antifungal and zinc tabs - she has been doing for 1 month, no real changes; she also c/o some tenderness of her midline palate    Past Medical History:  Past Medical History:   Diagnosis Date    [facility]     Diabetes ([facility]     Dizziness     Hypercholesteremia     Hyperlipidemia        Past Surgical History:   History reviewed. No pertinent surgical history.     Medications:   Current Outpatient Medications   Medication Instructions    lovastatin (MEVACOR) 40 mg, EVERY BEDTIME    metformin (GLUCOPHAGE) 500 mg tablet 2 TIMES DAILY WITH MEALS    triamcinolone acetonide (KENALOG) 0.1 % dental paste Apply to right cheek mucosa twice daily x 10 days       Allergies: Allergies   Allergen Reactions    Aspirin Nausea Only     Gi upset       Social History:   Social History     Tobacco Use    Smoking status: Current Every Day Smoker     Types: Cigarettes    Smokeless tobacco: Never Used   Vaping Use    Vaping Use: Never used   Substance Use Topics    Alcohol use: No    Drug use: No        Family History:  Family History   Family history unknown: Yes       Review of Systems:    Consitutional: denies fever, excessive weight gain or loss. Eyes: denies diplopia, eye pain. Integumentary: denies new concerning skin lesions. Ears, Nose, Mouth, Throat: denies except as per HPI. Endocrine: denies hot or cold intolerance, increased thirst.  Respiratory: denies cough, hemoptysis, wheezing  Gastrointestinal: denies trouble swallowing, nausea, emesis, regurgitation  Musculoskeletal: denies muscle weakness or wasting  Cardiovascular: denies chest pain, shortness of breath  Neurologic: denies seizures, numbness or tingling, syncope  Hematologic: denies easy bleeding or bruising    Physical Examination:   Vitals:    05/02/22 1337   BP: 122/78   BP 1 Location: Left upper arm   BP Patient Position: Sitting   BP Cuff Size: Adult   Pulse: 83   Resp: 17   Height: 5' 3\" (1.6 m)   Weight: 134 lb (60.8 kg)   SpO2: 98%        General: Comfortable, pleasant, appears stated age  Voice: Strong, speaking in full sentences, no stridor    Face: No masses or lesions, facial strength symmetric   Ears: External ears unremarkable. Bilateral ear canal clear. Tympanic membrane clear and intact, with visible landmarks. Clear middle ear space  Nose: External nose unremarkable. Dorsum midline. Anterior rhinoscopy demonstrates no lesions. Septum midline. Turbinates without hypertrophy. Oral Cavity / Oropharynx: No trismus.  Leukoplakia involving right posterior buccal mucosa, about 1.5 cm stable. Does not bleed with manipulation, non tender to manipulation. Left side has slight pigmentation posteriorly no leukoplakia. Palate with 3mm submucosal erythema midline. Uvula midline. Tonsils unremarkable. Base of tongue soft. Floor of mouth soft. Neck: Supple. No adenopathy. Thyroid unremarkable. Palpable laryngeal landmarks. Full neck range of motion   Neurologic: CN II - XI intact. Normal gait. No spontaneous, gaze induced, head shake nystagmus. Audiogram shows bilateral SNHL, WRS 80%      Oral pathology 8/2021  Component 8/20/21 1400   Diagnosis synopsis: Comment    Comment: Specimen A-Mucosa Biopsy, Oral, right buccal mucosa: BENIGN   SQUAMOUS EPITHELIUM WITH NONSPECIFIC PARAKERATOSIS AND   HYPERKERATOSIS. NEGATIVE FOR DYSPLASIA AND MALIGNANCY. Assessment and Plan:    1. Oral leukoplakia - biopsy benign. She also has burning mouth, dry mouth. Agree this could be lichen planus, no changes. I would stop the systemic medications and just use the topical, she agrees. 2. Burning mouth syndrome  3. SNHL    Fu 3 mos    The patient was instructed to return to clinic if no improvement or progression of symptoms. Signs to watch out for reviewed.

## 2022-08-01 ENCOUNTER — OFFICE VISIT (OUTPATIENT)
Dept: ENT CLINIC | Age: 72
End: 2022-08-01
Payer: MEDICARE

## 2022-08-01 VITALS
BODY MASS INDEX: 23.74 KG/M2 | RESPIRATION RATE: 17 BRPM | HEIGHT: 63 IN | DIASTOLIC BLOOD PRESSURE: 78 MMHG | WEIGHT: 134 LBS | SYSTOLIC BLOOD PRESSURE: 120 MMHG | HEART RATE: 85 BPM | OXYGEN SATURATION: 98 %

## 2022-08-01 DIAGNOSIS — K14.6 BURNING MOUTH SYNDROME: ICD-10-CM

## 2022-08-01 DIAGNOSIS — H90.3 SENSORINEURAL HEARING LOSS (SNHL) OF BOTH EARS: ICD-10-CM

## 2022-08-01 DIAGNOSIS — R68.2 DRY MOUTH: ICD-10-CM

## 2022-08-01 DIAGNOSIS — K13.21 LEUKOPLAKIA ORAL MUCOSA: Primary | ICD-10-CM

## 2022-08-01 DIAGNOSIS — H93.13 TINNITUS OF BOTH EARS: ICD-10-CM

## 2022-08-01 DIAGNOSIS — H61.23 BILATERAL IMPACTED CERUMEN: ICD-10-CM

## 2022-08-01 PROCEDURE — G8510 SCR DEP NEG, NO PLAN REQD: HCPCS | Performed by: OTOLARYNGOLOGY

## 2022-08-01 PROCEDURE — 69210 REMOVE IMPACTED EAR WAX UNI: CPT | Performed by: OTOLARYNGOLOGY

## 2022-08-01 PROCEDURE — 99214 OFFICE O/P EST MOD 30 MIN: CPT | Performed by: OTOLARYNGOLOGY

## 2022-08-01 PROCEDURE — G8420 CALC BMI NORM PARAMETERS: HCPCS | Performed by: OTOLARYNGOLOGY

## 2022-08-01 PROCEDURE — 1090F PRES/ABSN URINE INCON ASSESS: CPT | Performed by: OTOLARYNGOLOGY

## 2022-08-01 PROCEDURE — G8399 PT W/DXA RESULTS DOCUMENT: HCPCS | Performed by: OTOLARYNGOLOGY

## 2022-08-01 PROCEDURE — G8536 NO DOC ELDER MAL SCRN: HCPCS | Performed by: OTOLARYNGOLOGY

## 2022-08-01 PROCEDURE — 1101F PT FALLS ASSESS-DOCD LE1/YR: CPT | Performed by: OTOLARYNGOLOGY

## 2022-08-01 PROCEDURE — G8427 DOCREV CUR MEDS BY ELIG CLIN: HCPCS | Performed by: OTOLARYNGOLOGY

## 2022-08-01 PROCEDURE — 3017F COLORECTAL CA SCREEN DOC REV: CPT | Performed by: OTOLARYNGOLOGY

## 2022-08-01 PROCEDURE — 1123F ACP DISCUSS/DSCN MKR DOCD: CPT | Performed by: OTOLARYNGOLOGY

## 2022-08-01 RX ORDER — SIMVASTATIN 20 MG/1
20 TABLET, FILM COATED ORAL EVERY EVENING
COMMUNITY
Start: 2022-06-17

## 2022-08-01 NOTE — PROGRESS NOTES
Otolaryngology-Head and Neck Surgery  Follow Up Patient Visit     Patient: Ilya Zhang  YOB: 1950  MRN: [id number]  Date of Service: 8/1/2022      Chief Complaint:   Follow up mouth     Interval hx  Initally seen for dizziness which has improved, however noted to have oral leukoplakia  Underwent biopsy 2 months ago which was benign    Presents in follow up today    No pain or bleeding  Occasionally salty foods may sting    History of Present Illness: Ilya Zhang is a 67y.o. year old female who presents today for discussion of dizziness. Notes sudden onset of dizziness last week. Describes room spinning dizziness, intermittent episodes, with sensation of fogginess. Duration of episodes slightly unclear, perhaps minutes    Over the last few days has felt better    Often triggered by lying down, though unsure of laterality    No hearing concerns or changes    No medication changes    Now feels back to normal    12/6/21 -  6 week f/u pt states she used some of the kenalog paste, did not seem to help; she c/o burning both sides of mouth spencer with spicy or salty foods; also dry mouth at night; no further dizziness    5/2/2 - 6 mos f/u - burning mouth syndrome, oral leukoplakia - symptoms remain same - she went to [facility] saw a doctor there who dx her with lichen planus and gave her a regimen of griseofulvin, azathioprine, topical steroid/antifungal and zinc tabs - she has been doing for 1 month, no real changes; she also c/o some tenderness of her midline palate    8/1/22 - 3 mos fu - she states is doing better now, less burning only occasional, still avoids spicy foods; had a headache last week improved with claritin; also c/o some tinnitus    Past Medical History:  Past Medical History:   Diagnosis Date    Diabetes ([facility] [address] [facility]     Diabetes ([facility]     Dizziness     Hypercholesteremia     Hyperlipidemia        Past Surgical History:   History reviewed. No pertinent surgical history.     Medications: Current Outpatient Medications   Medication Instructions    lovastatin (MEVACOR) 40 mg, EVERY BEDTIME    metformin (GLUCOPHAGE) 500 mg tablet 2 TIMES DAILY WITH MEALS    simvastatin (ZOCOR) 20 mg, Oral, EVERY EVENING    triamcinolone acetonide (KENALOG) 0.1 % dental paste Apply to right cheek mucosa twice daily x 10 days       Allergies: Allergies   Allergen Reactions    Aspirin Nausea Only     Gi upset       Social History:   Social History     Tobacco Use    Smoking status: Every Day     Types: Cigarettes    Smokeless tobacco: Never   Vaping Use    Vaping Use: Never used   Substance Use Topics    Alcohol use: No    Drug use: No        Family History:  Family History   Family history unknown: Yes       Review of Systems:    Consitutional: denies fever, excessive weight gain or loss. Eyes: denies diplopia, eye pain. Integumentary: denies new concerning skin lesions. Ears, Nose, Mouth, Throat: denies except as per HPI. Endocrine: denies hot or cold intolerance, increased thirst.  Respiratory: denies cough, hemoptysis, wheezing  Gastrointestinal: denies trouble swallowing, nausea, emesis, regurgitation  Musculoskeletal: denies muscle weakness or wasting  Cardiovascular: denies chest pain, shortness of breath  Neurologic: denies seizures, numbness or tingling, syncope  Hematologic: denies easy bleeding or bruising    Physical Examination:   Vitals:    08/01/22 1335   BP: 120/78   BP 1 Location: Left upper arm   BP Patient Position: Sitting   BP Cuff Size: Adult   Pulse: 85   Resp: 17   Height: 5' 3\" (1.6 m)   Weight: 134 lb (60.8 kg)   SpO2: 98%        General: Comfortable, pleasant, appears stated age  Voice: Strong, speaking in full sentences, no stridor    Face: No masses or lesions, facial strength symmetric   Ears: External ears unremarkable. Cerumen AU  Nose: External nose unremarkable. Dorsum midline. Anterior rhinoscopy demonstrates no lesions. Septum midline. Turbinates without hypertrophy.   Oral Cavity / Oropharynx: No trismus. Leukoplakia involving right posterior buccal mucosa, about 1.5 cm stable. Does not bleed with manipulation, non tender to manipulation. Left side has slight pigmentation posteriorly no leukoplakia. Tonsils unremarkable. Base of tongue soft. Floor of mouth soft. Neck: Supple. No adenopathy. Thyroid unremarkable. Palpable laryngeal landmarks. Full neck range of motion   Neurologic: CN II - XI intact. Normal gait. No spontaneous, gaze induced, head shake nystagmus. Procedure - Removal Impacted Cerumen    Indications: cerumen impaction    Ears are examined under microscope/headlight.  bilateral ears are cleaned using otologic curette, suction, and/or alligator forceps. Audiogram shows bilateral SNHL, WRS 80%      Oral pathology 8/2021  Component 8/20/21 1400   Diagnosis synopsis: Comment    Comment: Specimen A-Mucosa Biopsy, Oral, right buccal mucosa: BENIGN   SQUAMOUS EPITHELIUM WITH NONSPECIFIC PARAKERATOSIS AND   HYPERKERATOSIS. NEGATIVE FOR DYSPLASIA AND MALIGNANCY. Assessment and Plan:    Oral leukoplakia - biopsy benign. She also has burning mouth, dry mouth. Agree this could be lichen planus, no changes. I would stop the systemic medications and just use the topical, she agrees. Burning mouth syndrome  - stable  SNHL,tinnitus  Cerumen,cleaned  Dry mouth - biotene    Fu 6 mos    The patient was instructed to return to clinic if no improvement or progression of symptoms. Signs to watch out for reviewed.

## 2022-08-01 NOTE — LETTER
8/1/2022    Patient: Natasha Boland   YOB: 1950   Date of Visit: 8/1/2022     Yaritza Mcbride MD  [address] [address]  Via Fax: [phone]    Dear Yaritza Mcbride MD,      Thank you for referring Ms. Glenny Shaw to [address] [place] [address] for evaluation. My notes for this consultation are attached. If you have questions, please do not hesitate to call me. I look forward to following your patient along with you.       Sincerely,    Coral Myers MD

## 2022-08-03 NOTE — LETTER
7/19/2021    Patient: Allen Quiros   YOB: 1950   Date of Visit: 7/15/2021     Brook Nuñez MD  [facility] [address] [address]  Via Fax: [phone]    Dear Brook Nuñez MD,      Thank you for referring Ms. Allen Quiros to [facility] [address] for evaluation. My notes for this consultation are attached. If you have questions, please do not hesitate to call me. I look forward to following your patient along with you.       Sincerely,    To Galan MD .

## 2023-05-18 RX ORDER — SIMVASTATIN 20 MG
20 TABLET ORAL EVERY EVENING
COMMUNITY
Start: 2022-06-17

## 2023-05-18 RX ORDER — LOVASTATIN 40 MG/1
40 TABLET ORAL NIGHTLY
COMMUNITY

## 2023-05-18 RX ORDER — TRIAMCINOLONE ACETONIDE 0.1 %
PASTE (GRAM) DENTAL
COMMUNITY
Start: 2021-10-22

## 2023-05-22 ENCOUNTER — OFFICE VISIT (OUTPATIENT)
Age: 73
End: 2023-05-22

## 2023-05-22 VITALS
BODY MASS INDEX: 23.74 KG/M2 | SYSTOLIC BLOOD PRESSURE: 118 MMHG | HEIGHT: 63 IN | HEART RATE: 85 BPM | DIASTOLIC BLOOD PRESSURE: 74 MMHG | WEIGHT: 134 LBS | OXYGEN SATURATION: 97 % | RESPIRATION RATE: 16 BRPM

## 2023-05-22 DIAGNOSIS — H61.23 IMPACTED CERUMEN, BILATERAL: ICD-10-CM

## 2023-05-22 DIAGNOSIS — K13.21 LEUKOPLAKIA OF ORAL MUCOSA, INCLUDING TONGUE: Primary | ICD-10-CM

## 2023-05-22 DIAGNOSIS — K14.6 BURNING MOUTH SYNDROME: ICD-10-CM

## 2023-05-22 PROCEDURE — 99213 OFFICE O/P EST LOW 20 MIN: CPT | Performed by: OTOLARYNGOLOGY

## 2023-05-22 PROCEDURE — 1123F ACP DISCUSS/DSCN MKR DOCD: CPT | Performed by: OTOLARYNGOLOGY

## 2023-05-22 PROCEDURE — 69210 REMOVE IMPACTED EAR WAX UNI: CPT | Performed by: OTOLARYNGOLOGY

## 2023-05-22 ASSESSMENT — ENCOUNTER SYMPTOMS
APNEA: 0
BACK PAIN: 0
EYE ITCHING: 0
WHEEZING: 0
COUGH: 0
SINUS PRESSURE: 0
EYE DISCHARGE: 0
STRIDOR: 0
SHORTNESS OF BREATH: 0
SINUS PAIN: 0
PHOTOPHOBIA: 0
VOICE CHANGE: 0
NAUSEA: 0
CHOKING: 0
ABDOMINAL PAIN: 0
VOMITING: 0
TROUBLE SWALLOWING: 0
SORE THROAT: 0

## 2023-05-22 NOTE — PROGRESS NOTES
Subjective:    Renny Leonardo   67 y.o.   1950     Followup Visit    History of Present Illness:    12/6/21 -  6 week f/u pt states she used some of the kenalog paste, did not seem to help; she c/o burning both sides of mouth lilo with spicy  or salty foods; also dry mouth at night; no further dizziness      5/2/2 - 6 mos f/u - burning mouth syndrome, oral leukoplakia - symptoms remain same - she went to [facility] saw a doctor there who dx her with lichen planus and gave her a regimen of griseofulvin, azathioprine, topical steroid/antifungal and zinc tabs - she  has been doing for 1 month, no real changes; she also c/o some tenderness of her midline palate      8/1/22 - 3 mos fu - she states is doing better now, less burning only occasional, still avoids spicy foods; had a headache last week improved with claritin; also c/o some tinnitus    5/22/2023  Follows up today, symptoms remain the same. She has some general sensitivity in the mouth to spicy foods or hot foods but no localizing symptoms. No burning mouth symptoms otherwise. Review of Systems  Review of Systems   Constitutional:  Negative for appetite change, chills, fatigue and fever. HENT:  Negative for congestion, ear discharge, ear pain, nosebleeds, postnasal drip, sinus pressure, sinus pain, sneezing, sore throat, tinnitus, trouble swallowing and voice change. Eyes:  Negative for photophobia, discharge, itching and visual disturbance. Respiratory:  Negative for apnea, cough, choking, shortness of breath, wheezing and stridor. Cardiovascular:  Negative for chest pain and palpitations. Gastrointestinal:  Negative for abdominal pain, nausea and vomiting. Endocrine: Negative for cold intolerance and heat intolerance. Genitourinary:  Negative for difficulty urinating and dysuria. Musculoskeletal:  Negative for arthralgias, back pain, gait problem, myalgias, neck pain and neck stiffness. Skin:  Negative for rash and wound. Nausea and vomiting

## 2023-05-24 ENCOUNTER — ANESTHESIA EVENT (OUTPATIENT)
Facility: HOSPITAL | Age: 73
End: 2023-05-24
Payer: MEDICARE

## 2023-05-24 ENCOUNTER — ANESTHESIA (OUTPATIENT)
Facility: HOSPITAL | Age: 73
End: 2023-05-24
Payer: MEDICARE

## 2023-05-24 ENCOUNTER — HOSPITAL ENCOUNTER (OUTPATIENT)
Facility: HOSPITAL | Age: 73
Setting detail: OUTPATIENT SURGERY
Discharge: HOME | End: 2023-05-24
Attending: INTERNAL MEDICINE | Admitting: INTERNAL MEDICINE
Payer: MEDICARE

## 2023-05-24 VITALS
HEART RATE: 67 BPM | SYSTOLIC BLOOD PRESSURE: 108 MMHG | WEIGHT: 123.5 LBS | OXYGEN SATURATION: 100 % | DIASTOLIC BLOOD PRESSURE: 65 MMHG | BODY MASS INDEX: 21.88 KG/M2 | TEMPERATURE: 97.7 F | RESPIRATION RATE: 13 BRPM

## 2023-05-24 LAB — HELIOBACTER PYLORI ID: ABNORMAL

## 2023-05-24 PROCEDURE — 2580000003 HC RX 258: Performed by: NURSE ANESTHETIST, CERTIFIED REGISTERED

## 2023-05-24 PROCEDURE — 2500000003 HC RX 250 WO HCPCS: Performed by: NURSE ANESTHETIST, CERTIFIED REGISTERED

## 2023-05-24 PROCEDURE — 3600007512: Performed by: INTERNAL MEDICINE

## 2023-05-24 PROCEDURE — 7100000011 HC PHASE II RECOVERY - ADDTL 15 MIN: Performed by: INTERNAL MEDICINE

## 2023-05-24 PROCEDURE — 2709999900 HC NON-CHARGEABLE SUPPLY: Performed by: INTERNAL MEDICINE

## 2023-05-24 PROCEDURE — 88305 TISSUE EXAM BY PATHOLOGIST: CPT

## 2023-05-24 PROCEDURE — 3700000000 HC ANESTHESIA ATTENDED CARE: Performed by: INTERNAL MEDICINE

## 2023-05-24 PROCEDURE — 6360000002 HC RX W HCPCS: Performed by: NURSE ANESTHETIST, CERTIFIED REGISTERED

## 2023-05-24 PROCEDURE — 3600007502: Performed by: INTERNAL MEDICINE

## 2023-05-24 PROCEDURE — 3700000001 HC ADD 15 MINUTES (ANESTHESIA): Performed by: INTERNAL MEDICINE

## 2023-05-24 PROCEDURE — 7100000010 HC PHASE II RECOVERY - FIRST 15 MIN: Performed by: INTERNAL MEDICINE

## 2023-05-24 RX ORDER — SODIUM CHLORIDE 0.9 % (FLUSH) 0.9 %
5-40 SYRINGE (ML) INJECTION EVERY 12 HOURS SCHEDULED
Status: DISCONTINUED | OUTPATIENT
Start: 2023-05-24 | End: 2023-05-24 | Stop reason: HOSPADM

## 2023-05-24 RX ORDER — LIDOCAINE HYDROCHLORIDE 20 MG/ML
INJECTION, SOLUTION EPIDURAL; INFILTRATION; INTRACAUDAL; PERINEURAL PRN
Status: DISCONTINUED | OUTPATIENT
Start: 2023-05-24 | End: 2023-05-24 | Stop reason: SDUPTHER

## 2023-05-24 RX ORDER — SODIUM CHLORIDE 9 MG/ML
25 INJECTION, SOLUTION INTRAVENOUS PRN
Status: DISCONTINUED | OUTPATIENT
Start: 2023-05-24 | End: 2023-05-24 | Stop reason: HOSPADM

## 2023-05-24 RX ORDER — SODIUM CHLORIDE 0.9 % (FLUSH) 0.9 %
5-40 SYRINGE (ML) INJECTION PRN
Status: DISCONTINUED | OUTPATIENT
Start: 2023-05-24 | End: 2023-05-24 | Stop reason: HOSPADM

## 2023-05-24 RX ORDER — 0.9 % SODIUM CHLORIDE 0.9 %
INTRAVENOUS SOLUTION INTRAVENOUS PRN
Status: DISCONTINUED | OUTPATIENT
Start: 2023-05-24 | End: 2023-05-24 | Stop reason: SDUPTHER

## 2023-05-24 RX ORDER — SODIUM CHLORIDE 9 MG/ML
INJECTION, SOLUTION INTRAVENOUS CONTINUOUS
Status: DISCONTINUED | OUTPATIENT
Start: 2023-05-24 | End: 2023-05-24 | Stop reason: HOSPADM

## 2023-05-24 RX ORDER — SIMETHICONE 20 MG/.3ML
EMULSION ORAL
Status: DISCONTINUED
Start: 2023-05-24 | End: 2023-05-24 | Stop reason: HOSPADM

## 2023-05-24 RX ADMIN — PROPOFOL 30 MG: 10 INJECTION, EMULSION INTRAVENOUS at 07:51

## 2023-05-24 RX ADMIN — PROPOFOL 30 MG: 10 INJECTION, EMULSION INTRAVENOUS at 07:55

## 2023-05-24 RX ADMIN — LIDOCAINE HYDROCHLORIDE 40 MG: 20 INJECTION, SOLUTION EPIDURAL; INFILTRATION; INTRACAUDAL; PERINEURAL at 07:50

## 2023-05-24 RX ADMIN — PROPOFOL 60 MG: 10 INJECTION, EMULSION INTRAVENOUS at 08:06

## 2023-05-24 RX ADMIN — PROPOFOL 80 MG: 10 INJECTION, EMULSION INTRAVENOUS at 07:50

## 2023-05-24 RX ADMIN — SODIUM CHLORIDE 500 ML: 9 INJECTION, SOLUTION INTRAVENOUS at 07:40

## 2023-05-24 RX ADMIN — PROPOFOL 50 MG: 10 INJECTION, EMULSION INTRAVENOUS at 08:00

## 2023-05-24 NOTE — ANESTHESIA POSTPROCEDURE EVALUATION
Department of Anesthesiology  Postprocedure Note    Patient: Leo Mccauley  MRN: [id number]  YOB: 1950  Date of evaluation: 5/24/2023      Procedure Summary     Date: 05/24/23 Room / Location: [facility] ENDO  / [facility] ENDOSCOPY    Anesthesia Start: 0740 Anesthesia Stop:     Procedures:       EGD DIAGNOSTIC ONLY      COLONOSCOPY DIAGNOSTIC      ESOPHAGOSCOPY BIOPSY      EGD POLYP ABLATION/OTHER (Upper GI Region) Diagnosis:       Abdominal cramping      (Abdominal cramping [R10.9])    Surgeons: Josem Mortimer, MD Responsible Provider: Luis Antonio Hernandez MD    Anesthesia Type: MAC ASA Status: 1          Anesthesia Type: No value filed.     Shreya Phase I:      Shreya Phase II: Shreya Score: 10      Anesthesia Post Evaluation    Patient location during evaluation: PACU  Level of consciousness: awake  Airway patency: patent  Nausea & Vomiting: no nausea  Complications: no  Cardiovascular status: blood pressure returned to baseline  Respiratory status: acceptable  Hydration status: euvolemic  Comments: --------------------            05/24/23               0845     --------------------   BP:       108/65     Pulse:      67       Resp:       13       Temp:                SpO2:      100%     --------------------

## 2023-05-24 NOTE — OP NOTE
118 [facility].  [address] [place] [address] [address], [address]   [phone]                           Colonoscopy and EGD Procedure Note      Indications:    Screening colonoscopy, GERD      :  Brando Dan MD    Staff: Circulator: Deirdre Ramon RN  Endoscopy Technician: Lexy Mcgraw     Implants: none    Referring Provider: Isaac Gutierrez MD    Sedation:  MAC anesthesia    Procedure Details:  After informed consent was obtained with all risks and benefits of procedure explained and preoperative exam completed, the patient was taken to the endoscopy suite and placed in the left lateral decubitus position. Upon sequential sedation as per above, a digital rectal exam was performed  And was normal.  The Olympus videocolonoscope  was inserted in the rectum and carefully advanced to the cecum, which was identified by the ileocecal valve and appendiceal orifice. The quality of preparation was good. The colonoscope was slowly withdrawn with careful evaluation between folds. Retroflexion in the rectum was performed and was normal..     Colon Findings:   Rectum: no mucosal lesion appreciated  Grade 1 internal and external hemorrhoid(s); Sigmoid: no mucosal lesion appreciated  Descending Colon: no mucosal lesion appreciated  Transverse Colon: no mucosal lesion appreciated  Ascending Colon: no mucosal lesion appreciated  Cecum: no mucosal lesion appreciated  Terminal Ileum: not intubated      Following sequential administration of sedation as per above, the [id number] gastroscope was inserted into the mouth and advanced under direct vision to second portion of the duodenum. A careful inspection was made as the gastroscope was withdrawn, including a retroflexed view of the proximal stomach; findings and interventions are described below. EGD Findings:  Esophagus:Normal mucosa. Small sliding hiatal hernia was noted with Z line at 37 cm and diaphragmatic pinch at 39 cm.    Stomach: 2

## 2023-05-24 NOTE — ANESTHESIA PRE PROCEDURE
Department of Anesthesiology  Preprocedure Note       Name:  Mich Avalos   Age:  67 y.o.  :  1950                                          MRN:  [id number]         Date:  2023      Surgeon: Aba Cope):  Brando Dan MD    Procedure: Procedure(s):  EGD DIAGNOSTIC ONLY  COLONOSCOPY DIAGNOSTIC    Medications prior to admission:   Prior to Admission medications    Medication Sig Start Date End Date Taking? Authorizing Provider   lovastatin (MEVACOR) 40 MG tablet Take 1 tablet by mouth nightly    Ar Automatic Reconciliation   metFORMIN (GLUCOPHAGE) 500 MG tablet Take by mouth 2 times daily (with meals)    Ar Automatic Reconciliation   simvastatin (ZOCOR) 20 MG tablet Take 1 tablet by mouth every evening 22   Ar Automatic Reconciliation   triamcinolone acetonide (KENALOG) 0.1 % paste Apply to right cheek mucosa twice daily x 10 days 10/22/21   Ar Automatic Reconciliation       Current medications:    No current facility-administered medications for this encounter. 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 Social History:    Social History     Tobacco Use    Smoking status: Every Day    Smokeless tobacco: Never   Substance Use Topics    Alcohol use: No                                Ready to quit: Not Answered  Counseling given: Not Answered      Vital Signs (Current): There were no vitals filed for this visit.                                            BP Readings from Last 3 Encounters:   23 118/74   22 120/78   22 122/78       NPO Status:

## 2023-05-24 NOTE — H&P
118 [facility].  217 [facility] 140 [facility], [place], [address]  [phone]                                History and Physical     NAME: Leo Mccauley   :  1950   MRN:  [id number]     HPI:  The patient was seen and examined. No past surgical history on file. Past Medical History:   Diagnosis Date    Diabetes ([address]     Diabetes ([facility]     Dizziness     Hypercholesteremia     Hyperlipidemia      Social History     Tobacco Use    Smoking status: Every Day    Smokeless tobacco: Never   Substance Use Topics    Alcohol use: No    Drug use: No     Allergies   Allergen Reactions    Aspirin Nausea Only     Gi upset     No family history on file. No current facility-administered medications for this encounter. PHYSICAL EXAM:  General: WD, WN. Alert, cooperative, no acute distress    HEENT: NC, Atraumatic. PERRLA, EOMI. Anicteric sclerae. Lungs:  CTA Bilaterally. No Wheezing/Rhonchi/Rales. Heart:  Regular  rhythm,  No murmur, No Rubs, No Gallops  Abdomen: Soft, Non distended, Non tender. +Bowel sounds, no HSM  Extremities: No c/c/e  Neurologic:  CN 2-12 gi, Alert and oriented X 3. No acute neurological distress   Psych:   Good insight. Not anxious nor agitated. The heart, lungs and mental status were satisfactory for the administration of MAC sedation and for the procedure. Mallampati score: 2     The patient was counseled at length about the risks of verónica Covid-19 in the nai-operative and post-operative states including the recovery window of their procedure. The patient was made aware that verónica Covid-19 after a surgical procedure may worsen their prognosis for recovering from the virus and lend to a higher morbidity and or mortality risk. The patient was given the options of postponing their procedure. All of the risks, benefits, and alternatives were discussed. The patient does wish to proceed with the procedure.       Assessment:   GERD  Screening

## 2023-05-24 NOTE — PROGRESS NOTES

## 2023-05-24 NOTE — PROGRESS NOTES
Pt cleared and appropriate for discharge. Pt is awake and alert and VS are at baseline. Responsible  contacted for discharge. All discharge instructions and all questions have been reviewed with understanding. Paper instructions provided.

## 2023-05-24 NOTE — DISCHARGE INSTRUCTIONS
118 [facility].  217 [facility] 107 [facility]  [phone]                               DISCHARGE INSTRUCTIONS    Bria Dickerson  [id number]  1950    DISCOMFORT:  Redness at IV site- apply warm compress to area; if redness or soreness persist- contact your physician  There may be a slight amount of blood passed from the rectum  Gaseous discomfort- walking, belching will help relieve any discomfort    DIET:   High fiber diet., GERD diet: avoid fried and fatty foods, peppermint, chocolate, alcohol, coffee, citrus fruits and juices, and tomato products. Avoid lying down for 2 to 3 hours after eating.   - however -  remember your colon is empty and a heavy meal will produce gas. Avoid these foods:  vegetables, fried / greasy foods, carbonated drinks for today   You may not  drink alcoholic beverages for at least 12 hours      ACTIVITY  Spend the remainder of the day resting -  avoid any strenuous activity, then you may resume your normal daily activities   You may not operate a vehicle for 12 hours  You may not  engage in an occupation involving machinery or appliances for rest of today  Avoid making any critical decisions for at least 24 hour    CALL M.D. ANY SIGN OF   Increasing pain, nausea, vomiting  Abdominal distension (swelling)  New increased bleeding (oral or rectal)  Fever (chills)  Pain in chest area  Bloody discharge from nose or mouth  Shortness of breath    You may not  take any Advil, Aspirin, Ibuprofen, Motrin, Aleve, or Goodys for 3 days, ONLY  Tylenol as needed for pain. Post procedure diagnosis:     Post-procedure recommendations:   -Await pathology. , -Follow symptoms. , -High fiber diet. GERD diet: avoid fried and fatty foods. peppermint, chocolate, alcohol, coffee, citrus fruits and juices, tomoato products; avoid lying down for 2 to 3 hours after eating.  -Repeat colonoscopy in 7 years     -Resume normal medication(s).      Follow-up

## 2024-07-22 ENCOUNTER — OFFICE VISIT (OUTPATIENT)
Age: 74
End: 2024-07-22
Payer: MEDICARE

## 2024-07-22 VITALS
OXYGEN SATURATION: 99 % | HEART RATE: 79 BPM | BODY MASS INDEX: 21.79 KG/M2 | WEIGHT: 123 LBS | DIASTOLIC BLOOD PRESSURE: 60 MMHG | HEIGHT: 63 IN | SYSTOLIC BLOOD PRESSURE: 118 MMHG

## 2024-07-22 DIAGNOSIS — K14.6 BURNING MOUTH SYNDROME: Primary | ICD-10-CM

## 2024-07-22 DIAGNOSIS — H61.21 IMPACTED CERUMEN OF RIGHT EAR: ICD-10-CM

## 2024-07-22 DIAGNOSIS — H90.3 SENSORINEURAL HEARING LOSS (SNHL) OF BOTH EARS: ICD-10-CM

## 2024-07-22 PROCEDURE — 69210 REMOVE IMPACTED EAR WAX UNI: CPT | Performed by: OTOLARYNGOLOGY

## 2024-07-22 PROCEDURE — 4004F PT TOBACCO SCREEN RCVD TLK: CPT | Performed by: OTOLARYNGOLOGY

## 2024-07-22 PROCEDURE — G8399 PT W/DXA RESULTS DOCUMENT: HCPCS | Performed by: OTOLARYNGOLOGY

## 2024-07-22 PROCEDURE — G8427 DOCREV CUR MEDS BY ELIG CLIN: HCPCS | Performed by: OTOLARYNGOLOGY

## 2024-07-22 PROCEDURE — G8420 CALC BMI NORM PARAMETERS: HCPCS | Performed by: OTOLARYNGOLOGY

## 2024-07-22 PROCEDURE — 3017F COLORECTAL CA SCREEN DOC REV: CPT | Performed by: OTOLARYNGOLOGY

## 2024-07-22 PROCEDURE — 1090F PRES/ABSN URINE INCON ASSESS: CPT | Performed by: OTOLARYNGOLOGY

## 2024-07-22 PROCEDURE — 99213 OFFICE O/P EST LOW 20 MIN: CPT | Performed by: OTOLARYNGOLOGY

## 2024-07-22 PROCEDURE — 1123F ACP DISCUSS/DSCN MKR DOCD: CPT | Performed by: OTOLARYNGOLOGY

## 2024-07-22 ASSESSMENT — ENCOUNTER SYMPTOMS
SINUS PRESSURE: 0
WHEEZING: 0
TROUBLE SWALLOWING: 0
VOMITING: 0
ABDOMINAL PAIN: 0
STRIDOR: 0
PHOTOPHOBIA: 0
APNEA: 0
CHOKING: 0
SHORTNESS OF BREATH: 0
VOICE CHANGE: 0
EYE DISCHARGE: 0
SORE THROAT: 0
COUGH: 0
EYE ITCHING: 0
NAUSEA: 0
SINUS PAIN: 0
BACK PAIN: 0

## 2025-03-31 ENCOUNTER — HOSPITAL ENCOUNTER (OUTPATIENT)
Facility: HOSPITAL | Age: 75
Discharge: HOME | End: 2025-04-03
Payer: MEDICARE

## 2025-03-31 DIAGNOSIS — M81.0 AGE-RELATED OSTEOPOROSIS WITHOUT CURRENT PATHOLOGICAL FRACTURE: ICD-10-CM

## 2025-03-31 PROCEDURE — 77080 DXA BONE DENSITY AXIAL: CPT

## 2025-07-28 ENCOUNTER — OFFICE VISIT (OUTPATIENT)
Age: 75
End: 2025-07-28
Payer: MEDICARE

## 2025-07-28 VITALS
BODY MASS INDEX: 21.86 KG/M2 | SYSTOLIC BLOOD PRESSURE: 100 MMHG | RESPIRATION RATE: 18 BRPM | OXYGEN SATURATION: 98 % | HEIGHT: 63 IN | HEART RATE: 79 BPM | WEIGHT: 123.4 LBS | DIASTOLIC BLOOD PRESSURE: 68 MMHG

## 2025-07-28 DIAGNOSIS — H90.3 SENSORINEURAL HEARING LOSS (SNHL) OF BOTH EARS: ICD-10-CM

## 2025-07-28 DIAGNOSIS — K14.6 BURNING MOUTH SYNDROME: Primary | ICD-10-CM

## 2025-07-28 PROCEDURE — G8399 PT W/DXA RESULTS DOCUMENT: HCPCS | Performed by: OTOLARYNGOLOGY

## 2025-07-28 PROCEDURE — 1090F PRES/ABSN URINE INCON ASSESS: CPT | Performed by: OTOLARYNGOLOGY

## 2025-07-28 PROCEDURE — 1159F MED LIST DOCD IN RCRD: CPT | Performed by: OTOLARYNGOLOGY

## 2025-07-28 PROCEDURE — 3017F COLORECTAL CA SCREEN DOC REV: CPT | Performed by: OTOLARYNGOLOGY

## 2025-07-28 PROCEDURE — 99213 OFFICE O/P EST LOW 20 MIN: CPT | Performed by: OTOLARYNGOLOGY

## 2025-07-28 PROCEDURE — 1123F ACP DISCUSS/DSCN MKR DOCD: CPT | Performed by: OTOLARYNGOLOGY

## 2025-07-28 PROCEDURE — G8420 CALC BMI NORM PARAMETERS: HCPCS | Performed by: OTOLARYNGOLOGY

## 2025-07-28 PROCEDURE — 1036F TOBACCO NON-USER: CPT | Performed by: OTOLARYNGOLOGY

## 2025-07-28 PROCEDURE — G8427 DOCREV CUR MEDS BY ELIG CLIN: HCPCS | Performed by: OTOLARYNGOLOGY

## 2025-07-28 RX ORDER — METRONIDAZOLE TOPICAL 7.5 MG/G
GEL TOPICAL
Qty: 45 G | Refills: 0 | Status: SHIPPED | OUTPATIENT
Start: 2025-07-28

## 2025-07-28 RX ORDER — GABAPENTIN 100 MG/1
CAPSULE ORAL
COMMUNITY
Start: 2025-06-25

## 2025-07-28 ASSESSMENT — ENCOUNTER SYMPTOMS
NAUSEA: 0
SINUS PAIN: 0
WHEEZING: 0
SORE THROAT: 0
EYE ITCHING: 0
PHOTOPHOBIA: 0
SINUS PRESSURE: 0
VOICE CHANGE: 0
EYE DISCHARGE: 0
STRIDOR: 0
COUGH: 0
ABDOMINAL PAIN: 0
VOMITING: 0
SHORTNESS OF BREATH: 0
CHOKING: 0
APNEA: 0
BACK PAIN: 0
TROUBLE SWALLOWING: 0

## 2025-07-28 NOTE — PROGRESS NOTES
Subjective:    Renetta Arroyo   74 y.o.   1950     Followup Visit    History of Present Illness:    12/6/21 -  6 week f/u pt states she used some of the kenalog paste, did not seem to help; she c/o burning both sides of mouth lilo with spicy  or salty foods; also dry mouth at night; no further dizziness      5/2/2 - 6 mos f/u - burning mouth syndrome, oral leukoplakia - symptoms remain same - she went to [facility] saw a doctor there who dx her with lichen planus and gave her a regimen of griseofulvin, azathioprine, topical steroid/antifungal and zinc tabs - she  has been doing for 1 month, no real changes; she also c/o some tenderness of her midline palate      8/1/22 - 3 mos fu - she states is doing better now, less burning only occasional, still avoids spicy foods; had a headache last week improved with claritin; also c/o some tinnitus    5/22/2023  Follows up today, symptoms remain the same.  She has some general sensitivity in the mouth to spicy foods or hot foods but no localizing symptoms.  No burning mouth symptoms otherwise.    7/22/24  1 year followup pt states symptoms stable, occ burning with spicy foods.  Also concerned about some hearing loss she is interested in possible hearing test    Review of Systems  Review of Systems   Constitutional:  Negative for appetite change, chills, fatigue and fever.   HENT:  Positive for hearing loss. Negative for congestion, ear discharge, ear pain, nosebleeds, postnasal drip, sinus pressure, sinus pain, sneezing, sore throat, tinnitus, trouble swallowing and voice change.    Eyes:  Negative for photophobia, discharge, itching and visual disturbance.   Respiratory:  Negative for apnea, cough, choking, shortness of breath, wheezing and stridor.    Cardiovascular:  Negative for chest pain and palpitations.   Gastrointestinal:  Negative for abdominal pain, nausea and vomiting.   Endocrine: Negative for cold intolerance and heat intolerance.   Genitourinary:  Negative for

## (undated) DEVICE — ELECTRODE PT RET AD L9FT HI MOIST COND ADH HYDRGEL CORDED

## (undated) DEVICE — SNARE ENDOSCP M L240CM W27MM SHTH DIA2.4MM CHN 2.8MM OVL

## (undated) DEVICE — SINGLE-USE BIOPSY FORCEPS: Brand: RADIAL JAW 4